# Patient Record
Sex: FEMALE | Race: BLACK OR AFRICAN AMERICAN
[De-identification: names, ages, dates, MRNs, and addresses within clinical notes are randomized per-mention and may not be internally consistent; named-entity substitution may affect disease eponyms.]

---

## 2017-02-21 ENCOUNTER — HOSPITAL ENCOUNTER (EMERGENCY)
Dept: HOSPITAL 68 - ERH | Age: 57
End: 2017-02-21
Payer: COMMERCIAL

## 2017-02-21 VITALS — HEIGHT: 63 IN | BODY MASS INDEX: 38.98 KG/M2 | WEIGHT: 220 LBS

## 2017-02-21 VITALS — DIASTOLIC BLOOD PRESSURE: 74 MMHG | SYSTOLIC BLOOD PRESSURE: 124 MMHG

## 2017-02-21 DIAGNOSIS — N64.4: Primary | ICD-10-CM

## 2017-02-21 NOTE — ED GI/GU/ABDOMINAL COMPLAINT
History of Present Illness
 
General
Chief Complaint: General Adult
Stated Complaint: PAIN IN LT BREAST
Source: patient
Exam Limitations: no limitations
 
Vital Signs & Intake/Output
Vital Signs & Intake/Output
 Vital Signs
 
 
Date Time Temp Pulse Resp B/P Pulse O2 O2 Flow FiO2
 
     Ox Delivery Rate 
 
2057 97.3 86 18 108/68 100 Room Air  
 
2022     99 Room Air  
 
1859 98.3 98 16 125/79 99 Room Air  
 
 
Allergies
Coded Allergies:
No Known Allergies (16)
 
Reconcile Medications
Aspirin (Adult Low Dose Aspirin EC) 81 MG TABLET.DR   1 TAB PO DAILY HEART/BLOOD
 (Reported)
Ibuprofen 600 MG TABLET   1 TAB PO TID PAIN
     with food
Losartan/Hydrochlorothiazide (Losartan-Hctz 100-25 MG Tab) 1 EACH TABLET   0.5 
TAB PO DAILY BP  (Reported)
Metformin HCl 500 MG TABLET   1 TAB PO BID DIABETES  (Reported)
 
Triage Note:
PT TO TRIAGE WITH C/O L BREAST PAIN 8/10 SINCE
 YESTERDAY. PT DENIES INJURY. VSS.
 NO OTHER COMPLAINTS.
Triage Nurses Notes Reviewed? yes
Pregnant? n
Is pt currently breastfeeding? No
HPI:
Patient presents for evaluation of a abrupt onset of left breast pain that began
yesterday while driving to work.  Patient states that is mainly in the upper 
inner quadrant but wraps around to the rest of the breast.  She denies any prior
episodes.  It is described as a severe soreness, intermittent lasting minutes at
a time and occurring about 3 times per day.  There is no change with movement or
palpation typically.  Last mammogram was last year and was normal according to 
patient.
 
Past History
 
Travel History
Traveled to [place] past 21 day No
 
Medical History
Any Pertinent Medical History? see below for history
Neurological: NONE
EENT: NONE
Cardiovascular: hypertension
Respiratory: NONE
Gastrointestinal: NONE
Hepatic: NONE
Renal: NONE
Musculoskeletal: NONE
Psychiatric: NONE
Endocrine: diabetes
Blood Disorders: NONE
Cancer(s): NONE
GYN/Reproductive: NONE
 
Surgical History
Surgical History: non-contributory
 
Psychosocial History
What is your primary language English
Tobacco Use: Never used
 
Family History
Hx Contributory? No
 
Review of Systems
 
Review of Systems
Constitutional:
Reports: no symptoms. 
EENTM:
Reports: no symptoms. 
Respiratory:
Reports: no symptoms. 
Cardiovascular:
Reports: no symptoms. 
GI:
Reports: no symptoms. 
Genitourinary:
Reports: see HPI. 
Musculoskeletal:
Reports: no symptoms. 
Skin:
Reports: no symptoms. 
Neurological/Psychological:
Reports: no symptoms. 
Hematologic/Endocrine:
Reports: no symptoms. 
Immunologic/Allergic:
Reports: no symptoms. 
All Other Systems: Reviewed and Negative
 
Physical Exam
 
Physical Exam
Gastrointestinal: see below
Comments:
Gen.: Well-nourished, well-developed, no acute respiratory distress.
Head: Normocephalic, atraumatic.
Eyes: Normal inspection bilaterally
Ears: Normal inspection bilaterally
Nose: Normal inspection
Throat/mouth : Moist mucosa 
Neck: Supple, full range of motion, no goiter
Heart: Regular rate and rhythm, no murmurs rubs or gallops
Lungs: Clear to auscultation over left chest with normal air entry
Chest: Nontender, left breast nontender with no palpable masses, no nipple 
discharge, no dimpling of the skin, no erythema or induration, no ecchymoses.
Back: Normal range of motion
Abdomen: Soft, nontender, nondistended, normal bowel sounds
Extremities: Normal range of motion grossly
Neurologic: Cranial nerves grossly intact, speech is clear, gait is stable
Skin: warm and dry
Psychiatric: Calm, cooperative, no apparent delusions or hallucinations
 
 
Core Measures
ACS in differential dx? No
Severe Sepsis Present: No
Septic Shock Present: No
 
Progress
Differential Diagnosis: MASTITIS, BREAST MASS, RIB MASS, LUNG MASS
Plan of Care:
 Orders
 
 
Procedure Date/time Status
 
XRY-CHEST XRAY, PA AND LATERAL 2003 Active
 
 
Diagnostic Imaging:
Discussed w/RAD: Radiology Read. 
CXR Impression: PATIENT: CHARISSA MAHONEY  MEDICAL RECORD NO: [id number] PRESENT AGE: 56
 PATIENT ACCOUNT NO: [id number] : 64  LOCATION: [facility] ORDERING PHYSICIAN: 
ISAAC ORTIZ MD     SERVICE DATE:  EXAM TYPE: RAD - XRY-CHEST 
XRAY, PA AND LATERAL EXAMINATION: XR CHEST CLINICAL INFORMATION: Left breast 
pain. COMPARISON: None. TECHNIQUE: PA and lateral views of the chest were 
obtained. FINDINGS: The lungs are hypoinflated but otherwise clear without focal
airspace consolidation. No pleural effusions or pneumothoraces are identified. 
Cardiomediastinal contours are within normal limits. Soft tissues are 
unremarkable. No acute osseous abnormality is identified. IMPRESSION: No acute 
pulmonary process. No visible acute osseous abnormality. Please note that this 
examination is not optimized to evaluate the left breast. If the patient 
continues to experience breast pain, recommend outpatient follow-up with 
mammogram and diagnostic ultrasound, as clinically indicated. DICTATED BY: 
KADEN RAMIREZ MD  DATE/TIME DICTATED:17 :RAD.BOWER 
DATE/TIME TRANSCRIBED:17 CONFIDENTIAL, DO NOT COPY WITHOUT 
APPROPRIATE AUTHORIZATION.  <Electronically signed in Other Vendor System>      
                                                                                
SIGNED BY: KADEN RAMIREZ MD 17
Initial ED EKG: none
 
Departure
 
Departure
Disposition: HOME OR SELF CARE
Condition: Stable
Clinical Impression
Primary Impression: Breast pain, left
Referrals:
ELGIN PUENTE,SABAS
 
[facility]
 
LAURYN PUENTE,RAKAN COSME MD,AUBREY WADDELL (PCP/Family)
 
Additional Instructions:
Contact Dr. Caldwell (breast surgeon) or Dr. Bright (OB/GYN) for reevaluation of
your left breast pain this week.  Contact the [facility] practice for a 
general medical doctor.  Contact Dr. Cosme if you require an endocrinologist.  
You may take ibuprofen 600 mg every 6 hours as needed for pain.  Return if any 
concerns or sudden worsening.
Please note that there might be incidental findings in your evaluation that are 
unrelated to the current emergency department visit.  Please notify your primary
care doctor about this emergency department visit in order to obtain and review 
all of the testing performed so that these incidental findings can be monitored 
as needed.
 
If you had an x-ray performed, please understand that some fractures may not be 
seen on the initial set of x-rays.  If your symptoms persist you might need a 
repeat set of x-rays to check for such a fracture.
 
If you had a laceration evaluated, please understand that foreign bodies such as
glass or wood may not be visible to the naked eye or on plain x-rays.  If the 
wound becomes red, swollen, increasingly more painful or if there is any 
drainage from the wound, please have it reevaluated by a physician for the 
possibility of a retained foreign body.
 
Thank you for choosing the [facility] Emergency Department for your care.
It was a pleasure to serve you today.
 
Isaac Ortiz M.D.
[facility]
Departure Forms:
Customer Survey
General Discharge Information

## 2017-02-21 NOTE — RADIOLOGY REPORT
EXAMINATION:
XR CHEST
 
CLINICAL INFORMATION:
Left breast pain.
 
COMPARISON:
None.
 
TECHNIQUE:
PA and lateral views of the chest were obtained.
 
FINDINGS:
The lungs are hypoinflated but otherwise clear without focal airspace
consolidation. No pleural effusions or pneumothoraces are identified.
Cardiomediastinal contours are within normal limits. Soft tissues are
unremarkable. No acute osseous abnormality is identified.
 
IMPRESSION:
No acute pulmonary process. No visible acute osseous abnormality. Please note
that this examination is not optimized to evaluate the left breast. If the
patient continues to experience breast pain, recommend outpatient follow-up
with mammogram and diagnostic ultrasound, as clinically indicated.

## 2018-09-11 ENCOUNTER — HOSPITAL ENCOUNTER (EMERGENCY)
Dept: HOSPITAL 68 - ERH | Age: 58
End: 2018-09-11
Payer: COMMERCIAL

## 2018-09-11 VITALS — HEIGHT: 62 IN | BODY MASS INDEX: 38.64 KG/M2 | WEIGHT: 210 LBS

## 2018-09-11 VITALS — SYSTOLIC BLOOD PRESSURE: 115 MMHG | DIASTOLIC BLOOD PRESSURE: 75 MMHG

## 2018-09-11 DIAGNOSIS — R51: Primary | ICD-10-CM

## 2018-09-11 DIAGNOSIS — Z79.82: ICD-10-CM

## 2018-09-11 DIAGNOSIS — Z79.84: ICD-10-CM

## 2018-09-11 DIAGNOSIS — I10: ICD-10-CM

## 2018-09-11 DIAGNOSIS — E11.9: ICD-10-CM
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
